# Patient Record
Sex: FEMALE
[De-identification: names, ages, dates, MRNs, and addresses within clinical notes are randomized per-mention and may not be internally consistent; named-entity substitution may affect disease eponyms.]

---

## 2022-07-01 PROBLEM — Z00.00 ENCOUNTER FOR PREVENTIVE HEALTH EXAMINATION: Status: ACTIVE | Noted: 2022-07-01

## 2022-08-02 ENCOUNTER — APPOINTMENT (OUTPATIENT)
Dept: GASTROENTEROLOGY | Facility: CLINIC | Age: 22
End: 2022-08-02

## 2022-08-02 VITALS
WEIGHT: 150 LBS | TEMPERATURE: 97.3 F | HEIGHT: 65 IN | RESPIRATION RATE: 18 BRPM | OXYGEN SATURATION: 97 % | SYSTOLIC BLOOD PRESSURE: 111 MMHG | HEART RATE: 84 BPM | DIASTOLIC BLOOD PRESSURE: 63 MMHG | BODY MASS INDEX: 24.99 KG/M2

## 2022-08-02 DIAGNOSIS — K59.00 CONSTIPATION, UNSPECIFIED: ICD-10-CM

## 2022-08-02 DIAGNOSIS — Z78.9 OTHER SPECIFIED HEALTH STATUS: ICD-10-CM

## 2022-08-02 DIAGNOSIS — K21.9 GASTRO-ESOPHAGEAL REFLUX DISEASE W/OUT ESOPHAGITIS: ICD-10-CM

## 2022-08-02 PROCEDURE — 99204 OFFICE O/P NEW MOD 45 MIN: CPT

## 2022-08-02 PROCEDURE — 99072 ADDL SUPL MATRL&STAF TM PHE: CPT

## 2022-08-02 RX ORDER — OMEPRAZOLE 40 MG/1
40 CAPSULE, DELAYED RELEASE ORAL
Qty: 1 | Refills: 1 | Status: ACTIVE | COMMUNITY
Start: 2022-08-02 | End: 1900-01-01

## 2022-08-02 RX ORDER — POLYETHYLENE GLYCOL 3350 17 G/17G
17 POWDER, FOR SOLUTION ORAL DAILY
Qty: 30 | Refills: 3 | Status: ACTIVE | COMMUNITY
Start: 2022-08-02 | End: 1900-01-01

## 2022-08-02 NOTE — HISTORY OF PRESENT ILLNESS
[FreeTextEntry1] : 21 yo female with c/o GERD and constipation and gas for one month. GERD 3 to 4 x a week. no dysphagia, no weight loss, no n/v. patient took pepcid with partial relief. Patient reports multiple small bms a day, no brbpr, no melena. no weight loss. Reports gas.\par \par no family h/o colon or gastric cancer

## 2022-08-02 NOTE — PHYSICAL EXAM
[General Appearance - Alert] : alert [General Appearance - In No Acute Distress] : in no acute distress [General Appearance - Well Nourished] : well nourished [General Appearance - Well Developed] : well developed [Sclera] : the sclera and conjunctiva were normal [] : the neck was supple [Auscultation Breath Sounds / Voice Sounds] : lungs were clear to auscultation bilaterally [Heart Sounds] : normal S1 and S2 [Bowel Sounds] : normal bowel sounds [Abdomen Soft] : soft [Abdomen Tenderness] : non-tender [No CVA Tenderness] : no ~M costovertebral angle tenderness [Abnormal Walk] : normal gait [Nail Clubbing] : no clubbing  or cyanosis of the fingernails [No Focal Deficits] : no focal deficits [Oriented To Time, Place, And Person] : oriented to person, place, and time

## 2022-08-02 NOTE — ASSESSMENT
[FreeTextEntry1] : 1. new onset gerd, no alarm symptoms\par \par planppi daily\par       f/u gi in 3 months\par       antireflux diet\par \par 2. constipation\par \par plan probiotics\par       miralax daily\par \par 3.RHCM\par \par plan patient needs CPE with blood work,pt to find PCP  through insurance\par \par

## 2022-08-02 NOTE — REVIEW OF SYSTEMS
[As Noted in HPI] : as noted in HPI [Anxiety] : anxiety [Depression] : depression [Muscle Weakness] : muscle weakness [Fever] : no fever [Chills] : no chills [Feeling Poorly] : not feeling poorly [Feeling Tired] : not feeling tired [Eyesight Problems] : no eyesight problems [Nosebleeds] : no nosebleeds [Nasal Discharge] : no nasal discharge [Chest Pain] : no chest pain [Cough] : no cough [SOB on Exertion] : no shortness of breath during exertion [Pelvic Pain] : no pelvic pain [Dysmenorrhea] : no dysmenorrhea [Limb Pain] : no limb pain [Skin Lesions] : no skin lesions [Swollen Glands] : no swollen glands [Swollen Glands In The Neck] : no swollen glands in the neck

## 2024-10-26 ENCOUNTER — OUTPATIENT (OUTPATIENT)
Dept: OUTPATIENT SERVICES | Facility: HOSPITAL | Age: 24
LOS: 1 days | End: 2024-10-26
Payer: MEDICAID

## 2024-10-26 VITALS
DIASTOLIC BLOOD PRESSURE: 72 MMHG | RESPIRATION RATE: 18 BRPM | SYSTOLIC BLOOD PRESSURE: 113 MMHG | HEART RATE: 80 BPM | TEMPERATURE: 98 F | OXYGEN SATURATION: 100 %

## 2024-10-26 DIAGNOSIS — O26.899 OTHER SPECIFIED PREGNANCY RELATED CONDITIONS, UNSPECIFIED TRIMESTER: ICD-10-CM

## 2024-10-26 LAB
AMNISURE ROM (RUPTURE OF MEMBRANES): NEGATIVE — SIGNIFICANT CHANGE UP
APPEARANCE UR: CLEAR — SIGNIFICANT CHANGE UP
BASOPHILS # BLD AUTO: 0 K/UL — SIGNIFICANT CHANGE UP (ref 0–0.2)
BASOPHILS NFR BLD AUTO: 0 % — SIGNIFICANT CHANGE UP (ref 0–2)
BILIRUB UR-MCNC: NEGATIVE — SIGNIFICANT CHANGE UP
COLOR SPEC: YELLOW — SIGNIFICANT CHANGE UP
DIFF PNL FLD: NEGATIVE — SIGNIFICANT CHANGE UP
EOSINOPHIL # BLD AUTO: 1.5 K/UL — HIGH (ref 0–0.5)
EOSINOPHIL NFR BLD AUTO: 9 % — HIGH (ref 0–6)
GLUCOSE UR QL: NEGATIVE MG/DL — SIGNIFICANT CHANGE UP
HCT VFR BLD CALC: 35.4 % — SIGNIFICANT CHANGE UP (ref 34.5–45)
HGB BLD-MCNC: 11.7 G/DL — SIGNIFICANT CHANGE UP (ref 11.5–15.5)
KETONES UR-MCNC: NEGATIVE MG/DL — SIGNIFICANT CHANGE UP
LEUKOCYTE ESTERASE UR-ACNC: NEGATIVE — SIGNIFICANT CHANGE UP
LYMPHOCYTES # BLD AUTO: 19 % — SIGNIFICANT CHANGE UP (ref 13–44)
LYMPHOCYTES # BLD AUTO: 3.17 K/UL — SIGNIFICANT CHANGE UP (ref 1–3.3)
MCHC RBC-ENTMCNC: 29 PG — SIGNIFICANT CHANGE UP (ref 27–34)
MCHC RBC-ENTMCNC: 33.1 GM/DL — SIGNIFICANT CHANGE UP (ref 32–36)
MCV RBC AUTO: 87.6 FL — SIGNIFICANT CHANGE UP (ref 80–100)
MONOCYTES # BLD AUTO: 0.83 K/UL — SIGNIFICANT CHANGE UP (ref 0–0.9)
MONOCYTES NFR BLD AUTO: 5 % — SIGNIFICANT CHANGE UP (ref 2–14)
NEUTROPHILS # BLD AUTO: 9.68 K/UL — HIGH (ref 1.8–7.4)
NEUTROPHILS NFR BLD AUTO: 57 % — SIGNIFICANT CHANGE UP (ref 43–77)
NITRITE UR-MCNC: NEGATIVE — SIGNIFICANT CHANGE UP
PH UR: 6.5 — SIGNIFICANT CHANGE UP (ref 5–8)
PLATELET # BLD AUTO: 341 K/UL — SIGNIFICANT CHANGE UP (ref 150–400)
PROT UR-MCNC: NEGATIVE MG/DL — SIGNIFICANT CHANGE UP
RBC # BLD: 4.04 M/UL — SIGNIFICANT CHANGE UP (ref 3.8–5.2)
RBC # FLD: 13.3 % — SIGNIFICANT CHANGE UP (ref 10.3–14.5)
SP GR SPEC: 1 — LOW (ref 1–1.03)
UROBILINOGEN FLD QL: 0.2 MG/DL — SIGNIFICANT CHANGE UP (ref 0.2–1)
WBC # BLD: 16.69 K/UL — HIGH (ref 3.8–10.5)
WBC # FLD AUTO: 16.69 K/UL — HIGH (ref 3.8–10.5)

## 2024-10-26 PROCEDURE — 76818 FETAL BIOPHYS PROFILE W/NST: CPT

## 2024-10-26 PROCEDURE — 85025 COMPLETE CBC W/AUTO DIFF WBC: CPT

## 2024-10-26 PROCEDURE — 76817 TRANSVAGINAL US OBSTETRIC: CPT | Mod: 26

## 2024-10-26 PROCEDURE — 36415 COLL VENOUS BLD VENIPUNCTURE: CPT

## 2024-10-26 PROCEDURE — 99283 EMERGENCY DEPT VISIT LOW MDM: CPT

## 2024-10-26 PROCEDURE — 99213 OFFICE O/P EST LOW 20 MIN: CPT

## 2024-10-26 PROCEDURE — 84112 EVAL AMNIOTIC FLUID PROTEIN: CPT

## 2024-10-26 PROCEDURE — 76818 FETAL BIOPHYS PROFILE W/NST: CPT | Mod: 26

## 2024-10-26 PROCEDURE — G0463: CPT

## 2024-10-26 PROCEDURE — 59025 FETAL NON-STRESS TEST: CPT

## 2024-10-26 PROCEDURE — 81003 URINALYSIS AUTO W/O SCOPE: CPT

## 2024-10-26 PROCEDURE — 76817 TRANSVAGINAL US OBSTETRIC: CPT

## 2024-10-26 RX ORDER — SODIUM CHLORIDE 9 G/1000ML
1000 INJECTION, SOLUTION INTRAVENOUS ONCE
Refills: 0 | Status: DISCONTINUED | OUTPATIENT
Start: 2024-10-26 | End: 2024-11-10

## 2024-10-29 DIAGNOSIS — Z03.71 ENCOUNTER FOR SUSPECTED PROBLEM WITH AMNIOTIC CAVITY AND MEMBRANE RULED OUT: ICD-10-CM

## 2024-10-29 DIAGNOSIS — Z3A.29 29 WEEKS GESTATION OF PREGNANCY: ICD-10-CM

## 2024-12-07 ENCOUNTER — OUTPATIENT (OUTPATIENT)
Dept: OUTPATIENT SERVICES | Facility: HOSPITAL | Age: 24
LOS: 1 days | End: 2024-12-07
Payer: MEDICAID

## 2024-12-07 VITALS
SYSTOLIC BLOOD PRESSURE: 119 MMHG | TEMPERATURE: 98 F | DIASTOLIC BLOOD PRESSURE: 76 MMHG | HEART RATE: 93 BPM | RESPIRATION RATE: 16 BRPM | OXYGEN SATURATION: 99 %

## 2024-12-07 DIAGNOSIS — O26.899 OTHER SPECIFIED PREGNANCY RELATED CONDITIONS, UNSPECIFIED TRIMESTER: ICD-10-CM

## 2024-12-07 PROCEDURE — 59025 FETAL NON-STRESS TEST: CPT

## 2024-12-07 PROCEDURE — 59025 FETAL NON-STRESS TEST: CPT | Mod: 26

## 2024-12-07 PROCEDURE — 99212 OFFICE O/P EST SF 10 MIN: CPT | Mod: 25

## 2024-12-07 PROCEDURE — 99283 EMERGENCY DEPT VISIT LOW MDM: CPT

## 2024-12-07 PROCEDURE — G0463: CPT

## 2024-12-09 DIAGNOSIS — Z3A.35 35 WEEKS GESTATION OF PREGNANCY: ICD-10-CM

## 2024-12-09 DIAGNOSIS — O36.8130 DECREASED FETAL MOVEMENTS, THIRD TRIMESTER, NOT APPLICABLE OR UNSPECIFIED: ICD-10-CM

## 2024-12-09 DIAGNOSIS — O47.03 FALSE LABOR BEFORE 37 COMPLETED WEEKS OF GESTATION, THIRD TRIMESTER: ICD-10-CM

## 2024-12-16 ENCOUNTER — OUTPATIENT (OUTPATIENT)
Dept: OUTPATIENT SERVICES | Facility: HOSPITAL | Age: 24
LOS: 1 days | End: 2024-12-16
Payer: MEDICAID

## 2024-12-16 VITALS
OXYGEN SATURATION: 99 % | DIASTOLIC BLOOD PRESSURE: 78 MMHG | SYSTOLIC BLOOD PRESSURE: 128 MMHG | TEMPERATURE: 100 F | HEART RATE: 108 BPM | RESPIRATION RATE: 18 BRPM

## 2024-12-16 DIAGNOSIS — O26.899 OTHER SPECIFIED PREGNANCY RELATED CONDITIONS, UNSPECIFIED TRIMESTER: ICD-10-CM

## 2024-12-16 PROCEDURE — G0463: CPT

## 2024-12-16 PROCEDURE — 99213 OFFICE O/P EST LOW 20 MIN: CPT

## 2024-12-16 PROCEDURE — 96360 HYDRATION IV INFUSION INIT: CPT

## 2024-12-16 PROCEDURE — 59025 FETAL NON-STRESS TEST: CPT

## 2024-12-16 PROCEDURE — 99283 EMERGENCY DEPT VISIT LOW MDM: CPT

## 2024-12-16 RX ORDER — 0.9 % SODIUM CHLORIDE 0.9 %
1000 INTRAVENOUS SOLUTION INTRAVENOUS ONCE
Refills: 0 | Status: COMPLETED | OUTPATIENT
Start: 2024-12-16 | End: 2024-12-16

## 2024-12-16 RX ADMIN — Medication 1000 MILLILITER(S): at 01:45

## 2024-12-16 NOTE — OB PROVIDER TRIAGE NOTE - PLAN OF CARE
Continue prenatal vitamins   If water breaks, you develop contractions, or notice vaginal bleeding return to delivery room  Check the baby movements with daily fetal kick count  No sex, nothing in the vagina, no heavy lifting and no strenous activities  Modified activities: walk as tolerated to prevent clot formation  Increase hydration, drink 2-3liters of water per day; avoid caffeine beverages which can cause palpitations and dehydration Continue prenatal vitamins   If water breaks, you develop contractions, or notice vaginal bleeding return to delivery room  Check the baby movements with daily fetal kick count  No sex, nothing in the vagina, no heavy lifting and no strenuous activities  Modified activities: walk as tolerated to prevent clot formation  Increase hydration, drink 2-3liters of water per day; avoid caffeine beverages which can cause palpitations and dehydration

## 2024-12-16 NOTE — OB PROVIDER TRIAGE NOTE - HISTORY OF PRESENT ILLNESS
23 yo  @ 36 3/7 weeks presents with contraction pain since 10pm with contractions every 2 minutes. Patient reports she had a couple episodes of vomiting earlier in the day. Pt reports passing her mucous plug when she was last seen in triage at [facility] on 2024. Pt reports at her last OB appt with Dr. Shah on  she was closed. Pt denies decreased fetal movement, vaginal bleeding or LOF. Pt denies fever, chills, headache, cp, sob, dizziness, palpitations, lightheadedness, urinary complaints or diarrhea.     PNC w/ Dr. Shah uncomplicated    POBHx:  - nulliparous    PGYNHx:  - no hx of fibroids, cysts STDs, abnormal PAPs, abnormal menses    PMHx:  - asthma; never had an asthma attack in the past, never hospitalized; no on meds currently    Meds:  - PNV  - Vitamin B3  - Famotidine     PSHx:  - denies    Allergies:  - NKDA    Social:   - no hx of smoking, alcohol use or drug use     PsychHX:  - no hx of anxiety or depression 23 yo  @ 36 3/7 weeks presents with contraction pain since 10pm with contractions every 2 minutes. Patient reports she had a couple episodes of vomiting earlier in the day. Pt reports passing her mucous plug when she was last seen in triage at [facility] on 2024. Pt reports at her last OB appt with Dr. Shah on  she was closed. Pt denies decreased fetal movement, vaginal bleeding or LOF. Pt denies fever, chills, headache, cp, sob, dizziness, palpitations, lightheadedness, urinary complaints or diarrhea.     PNC w/ Dr. Shah uncomplicated    POBHx:  - nulliparous    PGYNHx:  - no hx of fibroids, cysts STDs, abnormal PAPs, abnormal menses    PMHx:  - asthma; never had an asthma attack in the past, never hospitalized; no on meds currently    Meds:  - PNV  - Vitamin B3  - Famotidine     PSHx:  - denies    Allergies:  - NKDA    Social:   - no hx of smoking, alcohol use or drug use     PsychHx:  - no hx of anxiety or depression

## 2024-12-16 NOTE — OB RN TRIAGE NOTE - NSNURSINGINSTR_OBGYN_ALL_OB_FT
pt is a&ox4 , d/c home instructions given by SUSANA Rutledge reinforced by this RN. pt d/c home with pre-term labor precaution and kick count sheets, instructed to return if leakage of fluid, vaginal bleeding, decreased or no fetal movement, increase water intake to 2litres per day, f/u with next appointment with Dr Shah. pt verbalized understanding with good teach back left unit in stable condition with her mother. IV fluid heplock removed after IV completion with no swelling nor reddness noted to IV site.

## 2024-12-16 NOTE — OB PROVIDER TRIAGE NOTE - ADDITIONAL INSTRUCTIONS
Continue prenatal vitamins   If water breaks, you develop contractions, or notice vaginal bleeding return to delivery room  Check the baby movements with daily fetal kick count  No sex, nothing in the vagina, no heavy lifting and no strenuous activities  Modified activities: walk as tolerated to prevent clot formation  Increase hydration, drink 2-3liters of water per day; avoid caffeine beverages which can cause palpitations and dehydration F/u with Dr. Shah at next scheduled appt on 12/19  Continue prenatal vitamins   If water breaks, you develop contractions, or notice vaginal bleeding return to delivery room  Check the baby movements with daily fetal kick count  No sex, nothing in the vagina, no heavy lifting and no strenuous activities  Modified activities: walk as tolerated to prevent clot formation  Increase hydration, drink 2-3liters of water per day; avoid caffeine beverages which can cause palpitations and dehydration

## 2024-12-16 NOTE — OB PROVIDER TRIAGE NOTE - NSHPPHYSICALEXAM_GEN_ALL_CORE
Vital Signs Last 24 Hrs  T(C): 36.7 (16 Dec 2024 00:35), Max: 36.7 (16 Dec 2024 00:35)  T(F): 98.1 (16 Dec 2024 00:35), Max: 98.1 (16 Dec 2024 00:35)  HR: 103 (16 Dec 2024 00:35) (103 - 103)  BP: 119/84 (16 Dec 2024 00:35) (119/84 - 119/84)  RR: 19 (16 Dec 2024 00:35) (19 - 19)  SpO2: 99% (16 Dec 2024 00:35) (99% - 99%)    General: patient is sitting comfortably in NAD, A/O x 3  Cardio: RRR  Pulm: saturating well on room air  Abd: gravid uterus, nontender, nondistended  Extremities: nontender, no worsening edema  VE: 1/50/-3/ without evidence of bleeding or fluid  NST: 165bpm at baseline with moderate variability without accels or decels   [place]: irregular ctx

## 2024-12-16 NOTE — OB PROVIDER TRIAGE NOTE - NSOBPROVIDERNOTE_OBGYN_ALL_OB_FT
25 yo  @ 36 3/7 weeks with EDC 01/10/2025 c/w LMP 03/15/2024 presents with contraction pain since 10pm; rule out  labor; pt is stable    Plan:  1. 1L bolus LR  2. 25 yo  @ 36 3/7 weeks with EDC 01/10/2025 c/w LMP 03/15/2024 presents with contraction pain since 10pm; rule out  labor; pt is stable    Plan:  1. 1L bolus LR  2. Continue NST for one hour and reevaluate CTX pattern and cervical change as per Dr. Shah 25 yo  @ 36 3/7 weeks with EDC 01/10/2025 c/w LMP 03/15/2024 presents with contraction pain since 10pm; rule out  labor; pt is stable    Plan:  1. 1L bolus LR  2. Continue NST for one hour and reevaluate CTX pattern and cervical change as per Dr. Shah    F/u Note  - s/p 1L bolus LR patients contractions subsided and baseline went down to 155bpm at baseline with moderate variability with accels no decels 23 yo  @ 36 3/7 weeks with EDC 01/10/2025 c/w LMP 03/15/2024 presents with contraction pain since 10pm; rule out  labor; pt is stable    Plan:  1. 1L bolus LR  2. Continue NST for one hour and reevaluate CTX pattern and cervical change as per Dr. Shah    F/u Note  - s/p 1L bolus LR patients contractions subsided and baseline went down to 155bpm at baseline with moderate variability with accels no decels; uterine variability noted on toco  - one hour later and no cervical change made; pt remains /-3/int

## 2024-12-18 DIAGNOSIS — Z3A.36 36 WEEKS GESTATION OF PREGNANCY: ICD-10-CM

## 2024-12-18 DIAGNOSIS — O21.2 LATE VOMITING OF PREGNANCY: ICD-10-CM

## 2024-12-18 DIAGNOSIS — O47.03 FALSE LABOR BEFORE 37 COMPLETED WEEKS OF GESTATION, THIRD TRIMESTER: ICD-10-CM

## 2024-12-23 ENCOUNTER — OUTPATIENT (OUTPATIENT)
Dept: OUTPATIENT SERVICES | Facility: HOSPITAL | Age: 24
LOS: 1 days | End: 2024-12-23
Payer: MEDICAID

## 2024-12-23 VITALS
TEMPERATURE: 98 F | DIASTOLIC BLOOD PRESSURE: 72 MMHG | SYSTOLIC BLOOD PRESSURE: 112 MMHG | RESPIRATION RATE: 14 BRPM | HEART RATE: 78 BPM

## 2024-12-23 DIAGNOSIS — O26.899 OTHER SPECIFIED PREGNANCY RELATED CONDITIONS, UNSPECIFIED TRIMESTER: ICD-10-CM

## 2024-12-23 PROCEDURE — 59025 FETAL NON-STRESS TEST: CPT | Mod: 26

## 2024-12-23 PROCEDURE — 76819 FETAL BIOPHYS PROFIL W/O NST: CPT | Mod: 26

## 2024-12-23 PROCEDURE — 99212 OFFICE O/P EST SF 10 MIN: CPT | Mod: 25

## 2024-12-23 PROCEDURE — G0463: CPT

## 2024-12-23 PROCEDURE — 59025 FETAL NON-STRESS TEST: CPT

## 2024-12-23 PROCEDURE — 76819 FETAL BIOPHYS PROFIL W/O NST: CPT

## 2024-12-23 NOTE — OB PROVIDER TRIAGE NOTE - NSHPPHYSICALEXAM_GEN_ALL_CORE
Vital Signs Last 24 Hrs  T(C): 36.9 (23 Dec 2024 16:40), Max: 36.9 (23 Dec 2024 16:40)  T(F): 98.4 (23 Dec 2024 16:40), Max: 98.4 (23 Dec 2024 16:40)  HR: 78 (23 Dec 2024 16:40) (78 - 78)  BP: 112/72 (23 Dec 2024 16:40) (112/72 - 112/72)  BP(mean): --  RR: 14 (23 Dec 2024 16:40) (14 - 14)  SpO2: --    Parameters below as of 23 Dec 2024 16:40  Patient On (Oxygen Delivery Method): room air    gen: well appearing, no acute distress, AA+Ox 3  abd: gravid, soft, non tender  sve 1/40/-3/posterior/int    fh baseline 135 moderate variability +accels  toco q irritablility

## 2024-12-23 NOTE — OB PROVIDER TRIAGE NOTE - HISTORY OF PRESENT ILLNESS
25yo  @ 37w3d by stated  Rayshawn 1/10/2025 here for nst/bpp  Patient with no acute ob complaints  Patient admits to fetal movement  denies vaginal bleeding, leakage of fluid or contractions pain  pnc with Dr Shah , last visit 2024, uncomplicated per patient  pobhx nulliparous  pgynhx: lmp 3/15/2024 denies abn pap, fibroids, cyst or stds  pmedhx:  denies   psurghx: denies   allergies:  nkda  social: denies smoking, alcohol use or recreational drug use  25yo  @ 37w3d by stated  Hakeem 1/10/2025 here for nst/bpp  Patient with no acute ob complaints  Patient admits to fetal movement  denies vaginal bleeding, leakage of fluid or contractions pain  pnc with Dr Shah , last visit 2024, uncomplicated per patient  HAKEEM by sono on 2024, crl 13wks inconsi  pobhx nulliparous  pgynhx: lmp 3/15/2024 denies abn pap, fibroids, cyst or stds  pmedhx:  denies   psurghx: denies   allergies:  nkda  social: denies smoking, alcohol use or recreational drug use

## 2024-12-23 NOTE — OB PROVIDER TRIAGE NOTE - NSHPLABSRESULTS_GEN_ALL_CORE
< from: US Fetal Bio Profile w/o Non-Stress (12.23.24 @ 16:26) >    INTERPRETATION:  CLINICAL INFORMATION: Third trimester pregnancy, assess   fetal well-being.    GESTATIONAL AGE: 37 weeks 3 days.    COMPARISON: Obstetric sonogram dated 10/26/2024.    TECHNIQUE:  Transabdominal pelvic sonogram only.    FINDINGS:    Maternal Ovaries/Adnexa: Not visualized.    Single live Intrauterine gestation is present.  Fetal position: CEPHALIC presentation.  Placenta location: POSTERIOR.  Amniotic fluid volume: LACIE 15.1 cm.  Cervical length: 3.5 cm by transabdominal technique.  Fetal motion is seen in real-time and the fetal heart rate measures 129   bpm.    Fetal anatomy and umbilical cord were not evaluated.    The biophysical profile is 8/8.    Body Movement: 2  (score 2) Greater or equal than 3 body/limb movements  (score 0) Less than 3 body/limb movements    Tone: 2  (score 2) One or more episodes extension/flexion  (score 0) No episodes extension/flexion    Breathing movements: 2  (score 2) Any breathing movements or hiccups  (score 0) No breathing movements    Amniotic fluid: 2  (score 2) One pocket >= 2cm in 2 perpendicular planes  (score 0) <2 X 2 cm pocket    Additional comments: None.    IMPRESSION:  Single live intrauterine fetus in cephalic lie.    Normal biophysical profile.    < end of copied text >

## 2024-12-23 NOTE — OB RN TRIAGE NOTE - FALL HARM RISK - UNIVERSAL INTERVENTIONS
Bed in lowest position, wheels locked, appropriate side rails in place/Call bell, personal items and telephone in reach/Instruct patient to call for assistance before getting out of bed or chair/Non-slip footwear when patient is out of bed/[place] to call system/Physically safe environment - no spills, clutter or unnecessary equipment/Purposeful Proactive Rounding/Room/bathroom lighting operational, light cord in reach

## 2024-12-23 NOTE — OB PROVIDER TRIAGE NOTE - ADDITIONAL INSTRUCTIONS
a/p siup @ 37w3d by ultrasound, here for nst/bpp, nst reactive bpp 8/8  - d/c home with labor precautions  - patient to return to L+D if any vaginal bleeding, leakage fluid or painful contraction  - kick counts  - adequate hydration encouraged  - patient to f/u with primary OB this week a/p siup @ 37w3d by ultrasound, here for nst/bpp, nst reactive bpp 8/8  - d/c home with labor precautions  - patient to return to L+D if any vaginal bleeding, leakage fluid or painful contraction  - kick counts  - adequate hydration encouraged  - patient to f/u with DR Azul in office on 12/26/2024

## 2024-12-26 DIAGNOSIS — Z3A.37 37 WEEKS GESTATION OF PREGNANCY: ICD-10-CM

## 2024-12-26 DIAGNOSIS — Z03.79 ENCOUNTER FOR OTHER SUSPECTED MATERNAL AND FETAL CONDITIONS RULED OUT: ICD-10-CM

## 2024-12-30 ENCOUNTER — INPATIENT (INPATIENT)
Facility: HOSPITAL | Age: 24
LOS: 2 days | Discharge: HOME | DRG: 951 | End: 2025-01-02
Attending: OBSTETRICS & GYNECOLOGY | Admitting: OBSTETRICS & GYNECOLOGY
Payer: MEDICAID

## 2024-12-30 VITALS — OXYGEN SATURATION: 96 % | TEMPERATURE: 99 F | RESPIRATION RATE: 16 BRPM | HEART RATE: 103 BPM

## 2024-12-30 DIAGNOSIS — Z3A.38 38 WEEKS GESTATION OF PREGNANCY: ICD-10-CM

## 2024-12-30 DIAGNOSIS — O42.90 PREMATURE RUPTURE OF MEMBRANES, UNSPECIFIED AS TO LENGTH OF TIME BETWEEN RUPTURE AND ONSET OF LABOR, UNSPECIFIED WEEKS OF GESTATION: ICD-10-CM

## 2024-12-30 DIAGNOSIS — Z34.80 ENCOUNTER FOR SUPERVISION OF OTHER NORMAL PREGNANCY, UNSPECIFIED TRIMESTER: ICD-10-CM

## 2024-12-30 DIAGNOSIS — O26.899 OTHER SPECIFIED PREGNANCY RELATED CONDITIONS, UNSPECIFIED TRIMESTER: ICD-10-CM

## 2024-12-30 LAB
AMNISURE ROM (RUPTURE OF MEMBRANES): POSITIVE
APTT BLD: 30.6 SEC — SIGNIFICANT CHANGE UP (ref 24.5–35.6)
BASOPHILS # BLD AUTO: 0 K/UL — SIGNIFICANT CHANGE UP (ref 0–0.2)
BASOPHILS NFR BLD AUTO: 0 % — SIGNIFICANT CHANGE UP (ref 0–2)
EOSINOPHIL # BLD AUTO: 0.56 K/UL — HIGH (ref 0–0.5)
EOSINOPHIL NFR BLD AUTO: 4 % — SIGNIFICANT CHANGE UP (ref 0–6)
FIBRINOGEN PPP-MCNC: 613 MG/DL — HIGH (ref 200–475)
HCT VFR BLD CALC: 35.7 % — SIGNIFICANT CHANGE UP (ref 34.5–45)
HGB BLD-MCNC: 12.1 G/DL — SIGNIFICANT CHANGE UP (ref 11.5–15.5)
INR BLD: 0.91 RATIO — SIGNIFICANT CHANGE UP (ref 0.85–1.16)
LYMPHOCYTES # BLD AUTO: 19 % — SIGNIFICANT CHANGE UP (ref 13–44)
LYMPHOCYTES # BLD AUTO: 2.64 K/UL — SIGNIFICANT CHANGE UP (ref 1–3.3)
MCHC RBC-ENTMCNC: 28.3 PG — SIGNIFICANT CHANGE UP (ref 27–34)
MCHC RBC-ENTMCNC: 33.9 G/DL — SIGNIFICANT CHANGE UP (ref 32–36)
MCV RBC AUTO: 83.4 FL — SIGNIFICANT CHANGE UP (ref 80–100)
MONOCYTES # BLD AUTO: 0.56 K/UL — SIGNIFICANT CHANGE UP (ref 0–0.9)
MONOCYTES NFR BLD AUTO: 4 % — SIGNIFICANT CHANGE UP (ref 2–14)
NEUTROPHILS # BLD AUTO: 7.38 K/UL — SIGNIFICANT CHANGE UP (ref 1.8–7.4)
NEUTROPHILS NFR BLD AUTO: 53 % — SIGNIFICANT CHANGE UP (ref 43–77)
PLATELET # BLD AUTO: 375 K/UL — SIGNIFICANT CHANGE UP (ref 150–400)
PROTHROM AB SERPL-ACNC: 10.6 SEC — SIGNIFICANT CHANGE UP (ref 9.9–13.4)
RBC # BLD: 4.28 M/UL — SIGNIFICANT CHANGE UP (ref 3.8–5.2)
RBC # FLD: 14.7 % — HIGH (ref 10.3–14.5)
WBC # BLD: 13.92 K/UL — HIGH (ref 3.8–10.5)
WBC # FLD AUTO: 13.92 K/UL — HIGH (ref 3.8–10.5)

## 2024-12-30 RX ORDER — OXYTOCIN IN 5 % DEXTROSE 20/500ML
167 PLASTIC BAG, INJECTION (ML) INTRAVENOUS
Qty: 30 | Refills: 0 | Status: DISCONTINUED | OUTPATIENT
Start: 2024-12-30 | End: 2024-12-31

## 2024-12-30 RX ORDER — CITRIC ACID/SODIUM CITRATE 334-500MG
15 SOLUTION, ORAL ORAL EVERY 6 HOURS
Refills: 0 | Status: DISCONTINUED | OUTPATIENT
Start: 2024-12-30 | End: 2024-12-31

## 2024-12-30 RX ORDER — CITRIC ACID/SODIUM CITRATE 334-500MG
15 SOLUTION, ORAL ORAL EVERY 6 HOURS
Refills: 0 | Status: DISCONTINUED | OUTPATIENT
Start: 2024-12-30 | End: 2024-12-30

## 2024-12-30 RX ORDER — AMPICILLIN TRIHYDRATE 125 MG/5ML
1 SUSPENSION, RECONSTITUTED, ORAL (ML) ORAL EVERY 6 HOURS
Refills: 0 | Status: DISCONTINUED | OUTPATIENT
Start: 2024-12-30 | End: 2024-12-31

## 2024-12-30 RX ORDER — CHLORHEXIDINE GLUCONATE 1.2 MG/ML
1 RINSE ORAL DAILY
Refills: 0 | Status: DISCONTINUED | OUTPATIENT
Start: 2024-12-30 | End: 2024-12-31

## 2024-12-30 RX ORDER — AMPICILLIN TRIHYDRATE 125 MG/5ML
2 SUSPENSION, RECONSTITUTED, ORAL (ML) ORAL ONCE
Refills: 0 | Status: COMPLETED | OUTPATIENT
Start: 2024-12-30 | End: 2024-12-30

## 2024-12-30 RX ORDER — SODIUM CHLORIDE 9 MG/ML
1000 INJECTION, SOLUTION INTRAVENOUS
Refills: 0 | Status: DISCONTINUED | OUTPATIENT
Start: 2024-12-30 | End: 2024-12-31

## 2024-12-30 RX ADMIN — SODIUM CHLORIDE 125 MILLILITER(S): 9 INJECTION, SOLUTION INTRAVENOUS at 17:00

## 2024-12-30 RX ADMIN — Medication 200 GRAM(S): at 21:25

## 2024-12-30 NOTE — OB RN PATIENT PROFILE - NAME OF FATHER, OB PROFILE
Thyroid Supplements Protocol [id number]/23/2021 06:53 AM   Appointment in past 12 or next 3 months Protocol Details    TSH test in past 12 months     TSH value between 0.350 and 5.500 IU/ml Dexter Mc

## 2024-12-30 NOTE — OB PROVIDER H&P - NSHPLABSRESULTS_GEN_ALL_CORE
12.1   13.92 )-----------( 375      ( 30 Dec 2024 17:55 )             35.7   AmniSure ROM (Rupture of Membranes): Positive: TYPE:(C=Critical, N=Notification, A=Abnormal) C  TESTS: AMNISR  DATE/TIME CALLED: 12/30/2024 16:22:30 EST  CALLED TO: PATRICE OJ  READ BACK (2 Patient Identifiers)(Y/N): Y  READ BACK VALUES (Y/N): Y  CALLED BY: BRIAN 12/30/2024 16:22:36 EST  AmniSure is an immunochromatographic assay for PAMG-1, an amniotic fluid  protein. Detection of PAMG-1 in vaginal fluid is evidence of ruptured  fetal membranes. Results should be interpreted in conjunction with  clinical and other laboratory findings. (12.30.24 @ 15:55)

## 2024-12-30 NOTE — OB PROVIDER H&P - NSHPPHYSICALEXAM_GEN_ALL_CORE
Vital Signs Last 24 Hrs  T(C): 36.8 (30 Dec 2024 17:45), Max: 37 (30 Dec 2024 15:29)  T(F): 98.2 (30 Dec 2024 17:45), Max: 98.6 (30 Dec 2024 15:29)  HR: 89 (30 Dec 2024 17:45) (89 - 103)  BP: 126/84 (30 Dec 2024 17:45) (117/78 - 126/84)  BP(mean): --  RR: 16 (30 Dec 2024 17:45) (16 - 18)  SpO2: 97% (30 Dec 2024 17:45) (96% - 100%)    Parameters below as of 30 Dec 2024 17:45  Patient On (Oxygen Delivery Method): room air    gen: nad, well appearing  abd; gravid, soft, non tender  speculum: no pooling, no vaginal bleeding, minimal white discharge, amnisure sent  sve by SUSANA Arvizu,  1/50/-3  nst cat 1  toco q irregular

## 2024-12-30 NOTE — OB PROVIDER H&P - NSICDXPASTMEDICALHX_GEN_ALL_CORE_FT
PAST MEDICAL HISTORY:  Asthma      PAST MEDICAL HISTORY:  History of irregular menstrual cycles

## 2024-12-30 NOTE — OB PROVIDER H&P - ASSESSMENT
Prescription approved per [facility] Refill Protocol.     a/p siup @ 38w3d, HAKEEM by teresa, here for r/o prom. amnisure positive, nst cat1  - admit for induction of labor  - consent at bedside  - admission labs  - pain management upon request  -Dr Shah notified, awaiting further  instructions on induction agent  nst reviewed by DR Landa, house attending

## 2024-12-30 NOTE — OB RN TRIAGE NOTE - SUICIDE SCREENING QUESTION 1
Fax received from [employer] stating enrollment in [employer] has been terminated for patient non-compliance. Called pt's mom to verify she received above info and if she meant to let this leonel lapse. Mom states she was not aware and had not needed to use this leonel as of yet. Mom states she will call to see about keeping account active. Gave mom # [phone] and instructed her to call with any questions. Mom verbalized complete understanding.    No

## 2024-12-30 NOTE — OB PROVIDER H&P - HISTORY OF PRESENT ILLNESS
25yo  @ 38w3d  Edc 1/10/2025  by teresa presents to triage as instructed by Dr Shah for r/o rupture. Patient states she had noticed some leakage of fluids on 2024  Patient admits to fetal movement  denies vaginal bleeding or contractions pain  pnc with Dr Shah , last visit today, uncomplicated per patient  HAKEEM by teresa on 2024, crl 13wks inconsistent with lmp  pobhx nulliparous  pgynhx: lmp 3/15/2024, irregular menses denies abn pap, fibroids, cyst or stds  pmedhx:  denies   psurghx: denies   allergies:  nkda  social: denies smoking, alcohol use or recreational drug use  23yo  @ 38w3d  Edc 1/10/2025  by teresa presents to triage as instructed by Dr Shah for r/o rupture. Patient states she had noticed some leakage of fluids on 2024  Patient admits to fetal movement  denies vaginal bleeding or contractions pain  pnc with Dr Shah , last visit today, uncomplicated per patient  HAKEEM by teresa on 2024, crl 13wks inconsistent with lmp  pobhx nulliparous  pgynhx: lmp 3/15/2024, irregular menses denies abn pap, fibroids, cyst or stds  pmedhx:  childhood asthma- has not used albuterol  since childhood, no hospital or intubations  psurghx: denies   allergies:  nkda  social: denies smoking, alcohol use or recreational drug use

## 2024-12-31 PROBLEM — J45.909 UNSPECIFIED ASTHMA, UNCOMPLICATED: Chronic | Status: INACTIVE | Noted: 2024-12-16 | Resolved: 2024-12-30

## 2024-12-31 LAB
HCV AB S/CO SERPL IA: 0.23 S/CO — SIGNIFICANT CHANGE UP (ref 0–0.99)
HCV AB SERPL-IMP: SIGNIFICANT CHANGE UP
T PALLIDUM AB TITR SER: NEGATIVE — SIGNIFICANT CHANGE UP

## 2024-12-31 RX ORDER — PNV/FERROUS FUM/DOCUSATE/FOLIC 90-50-1MG
1 TABLET, EXTENDED RELEASE ORAL DAILY
Refills: 0 | Status: DISCONTINUED | OUTPATIENT
Start: 2024-12-31 | End: 2025-01-02

## 2024-12-31 RX ORDER — IBUPROFEN 200 MG
600 TABLET ORAL EVERY 6 HOURS
Refills: 0 | Status: DISCONTINUED | OUTPATIENT
Start: 2024-12-31 | End: 2025-01-02

## 2024-12-31 RX ORDER — KETOROLAC TROMETHAMINE 30 MG/ML
30 INJECTION INTRAMUSCULAR; INTRAVENOUS ONCE
Refills: 0 | Status: DISCONTINUED | OUTPATIENT
Start: 2024-12-31 | End: 2025-01-02

## 2024-12-31 RX ORDER — MAGNESIUM HYDROXIDE 400 MG/5ML
30 SUSPENSION, ORAL (FINAL DOSE FORM) ORAL
Refills: 0 | Status: DISCONTINUED | OUTPATIENT
Start: 2024-12-31 | End: 2025-01-02

## 2024-12-31 RX ORDER — LANOLIN
1 OINTMENT (GRAM) TOPICAL EVERY 6 HOURS
Refills: 0 | Status: DISCONTINUED | OUTPATIENT
Start: 2024-12-31 | End: 2025-01-02

## 2024-12-31 RX ORDER — BENZOCAINE/MENTH/CETYLPYRD CL 15 MG-4 MG
1 LOZENGE MUCOUS MEMBRANE EVERY 6 HOURS
Refills: 0 | Status: DISCONTINUED | OUTPATIENT
Start: 2024-12-31 | End: 2025-01-02

## 2024-12-31 RX ORDER — OXYCODONE HCL 15 MG
5 TABLET ORAL
Refills: 0 | Status: DISCONTINUED | OUTPATIENT
Start: 2024-12-31 | End: 2025-01-02

## 2024-12-31 RX ORDER — HYDROCORTISONE 1 %
1 CREAM (GRAM) TOPICAL EVERY 6 HOURS
Refills: 0 | Status: DISCONTINUED | OUTPATIENT
Start: 2024-12-31 | End: 2025-01-02

## 2024-12-31 RX ORDER — WITCH HAZEL 0.5 ML/ML
1 SOLUTION TOPICAL EVERY 4 HOURS
Refills: 0 | Status: DISCONTINUED | OUTPATIENT
Start: 2024-12-31 | End: 2025-01-02

## 2024-12-31 RX ORDER — CLOSTRIDIUM TETANI TOXOID ANTIGEN (FORMALDEHYDE INACTIVATED), CORYNEBACTERIUM DIPHTHERIAE TOXOID ANTIGEN (FORMALDEHYDE INACTIVATED), BORDETELLA PERTUSSIS TOXOID ANTIGEN (GLUTARALDEHYDE INACTIVATED), BORDETELLA PERTUSSIS FILAMENTOUS HEMAGGLUTININ ANTIGEN (FORMALDEHYDE INACTIVATED), BORDETELLA PERTUSSIS PERTACTIN ANTIGEN, AND BORDETELLA PERTUSSIS FIMBRIAE 2/3 ANTIGEN 5; 2; 2.5; 5; 3; 5 [LF]/.5ML; [LF]/.5ML; UG/.5ML; UG/.5ML; UG/.5ML; UG/.5ML
0.5 INJECTION, SUSPENSION INTRAMUSCULAR ONCE
Refills: 0 | Status: DISCONTINUED | OUTPATIENT
Start: 2024-12-31 | End: 2025-01-02

## 2024-12-31 RX ORDER — ACETAMINOPHEN 80 MG/.8ML
975 SOLUTION/ DROPS ORAL
Refills: 0 | Status: DISCONTINUED | OUTPATIENT
Start: 2024-12-31 | End: 2025-01-02

## 2024-12-31 RX ORDER — OXYTOCIN IN 5 % DEXTROSE 20/500ML
2 PLASTIC BAG, INJECTION (ML) INTRAVENOUS
Qty: 30 | Refills: 0 | Status: DISCONTINUED | OUTPATIENT
Start: 2024-12-31 | End: 2024-12-31

## 2024-12-31 RX ORDER — OXYTOCIN IN 5 % DEXTROSE 20/500ML
167 PLASTIC BAG, INJECTION (ML) INTRAVENOUS
Qty: 30 | Refills: 0 | Status: DISCONTINUED | OUTPATIENT
Start: 2024-12-31 | End: 2025-01-02

## 2024-12-31 RX ORDER — SIMETHICONE 125 MG/1
80 CAPSULE, LIQUID FILLED ORAL EVERY 4 HOURS
Refills: 0 | Status: DISCONTINUED | OUTPATIENT
Start: 2024-12-31 | End: 2025-01-02

## 2024-12-31 RX ORDER — SODIUM CHLORIDE 9 MG/ML
3 INJECTION, SOLUTION INTRAMUSCULAR; INTRAVENOUS; SUBCUTANEOUS EVERY 8 HOURS
Refills: 0 | Status: DISCONTINUED | OUTPATIENT
Start: 2024-12-31 | End: 2025-01-02

## 2024-12-31 RX ORDER — IBUPROFEN 200 MG
600 TABLET ORAL EVERY 6 HOURS
Refills: 0 | Status: COMPLETED | OUTPATIENT
Start: 2024-12-31 | End: 2025-11-29

## 2024-12-31 RX ORDER — DIPHENHYDRAMINE HCL 25 MG
25 TABLET ORAL EVERY 6 HOURS
Refills: 0 | Status: DISCONTINUED | OUTPATIENT
Start: 2024-12-31 | End: 2025-01-02

## 2024-12-31 RX ADMIN — Medication 108 GRAM(S): at 03:00

## 2024-12-31 RX ADMIN — Medication 2 MILLIUNIT(S)/MIN: at 08:15

## 2024-12-31 RX ADMIN — Medication 1 TABLET(S): at 13:32

## 2024-12-31 RX ADMIN — ACETAMINOPHEN 975 MILLIGRAM(S): 80 SOLUTION/ DROPS ORAL at 21:25

## 2024-12-31 RX ADMIN — ACETAMINOPHEN 975 MILLIGRAM(S): 80 SOLUTION/ DROPS ORAL at 22:30

## 2024-12-31 RX ADMIN — Medication 167 MILLIUNIT(S)/MIN: at 10:00

## 2024-12-31 RX ADMIN — Medication 600 MILLIGRAM(S): at 23:52

## 2024-12-31 RX ADMIN — SODIUM CHLORIDE 3 MILLILITER(S): 9 INJECTION, SOLUTION INTRAMUSCULAR; INTRAVENOUS; SUBCUTANEOUS at 22:55

## 2024-12-31 RX ADMIN — Medication 108 GRAM(S): at 09:25

## 2024-12-31 RX ADMIN — SODIUM CHLORIDE 3 MILLILITER(S): 9 INJECTION, SOLUTION INTRAMUSCULAR; INTRAVENOUS; SUBCUTANEOUS at 14:51

## 2024-12-31 NOTE — OB PROVIDER LABOR PROGRESS NOTE - NS_SUBJECTIVE/OBJECTIVE_OBGYN_ALL_OB_FT
Patient seen resting comfortably.  No complaints.
Tracing note
Patient seen resting comfortably.  Pitocin started, but then discontinued due to variable/late decels.  Patient repositioned and checked.
Pt c/o pelvic pain/contractions q 2 minutes.
pt comfortable with epidural

## 2024-12-31 NOTE — OB PROVIDER LABOR PROGRESS NOTE - ASSESSMENT
25 y/o  @ 38w3d HAKEEM: 01/10, admitted for PROM on , IOL with po cytotec.     - cont close maternal and fetal monitoring  - cont po cytotec  - Discussed with Dr. Shah
Patient in active labor    -Continue monitoring fht, toco, vitals  -Pain management in place  -Encouraged to push with urge  -Continue current care    Dr Shah and Dr Valentine aware 
Patient undergoing mIOL for PROM    -Continue monitoring fht, toco, vitals  -Pain management in place  -T&C for variables  -Pitocin for augmentation  -Continue current care    Dr Shah aware 
cat II  forebag arom clear iupc placed  hold cytotec  left side  nst reviewed with dr.Williams Noe notified  keegan
25 y/o  @ 38w3d HAKEEM: 01/10, admitted for PROM on , IOL with po cytotec.     - cont close maternal and fetal monitoring  - cont po cytotec  - Discussed with Dr. Shah

## 2024-12-31 NOTE — OB PROVIDER LABOR PROGRESS NOTE - NS_OBIHIFHRDETAILS_OBGYN_ALL_OB_FT
Baseline 125, moderate variability +accels, occasional decels
Cat 1 tracing  Baseline 135  Moderate variability  + accels, no decels
Baseline 120, moderate variability +accels, +variable, early and late decels
cat II
cat 1 tracing  Baseline 120  Moderate variability  + Accels, no decels

## 2025-01-01 ENCOUNTER — TRANSCRIPTION ENCOUNTER (OUTPATIENT)
Age: 25
End: 2025-01-01

## 2025-01-01 LAB
HCT VFR BLD CALC: 32.7 % — LOW (ref 34.5–45)
HGB BLD-MCNC: 10.9 G/DL — LOW (ref 11.5–15.5)
MCHC RBC-ENTMCNC: 28.1 PG — SIGNIFICANT CHANGE UP (ref 27–34)
MCHC RBC-ENTMCNC: 33.3 G/DL — SIGNIFICANT CHANGE UP (ref 32–36)
MCV RBC AUTO: 84.3 FL — SIGNIFICANT CHANGE UP (ref 80–100)
NRBC # BLD: 0 /100 WBCS — SIGNIFICANT CHANGE UP (ref 0–0)
PLATELET # BLD AUTO: 271 K/UL — SIGNIFICANT CHANGE UP (ref 150–400)
RBC # BLD: 3.88 M/UL — SIGNIFICANT CHANGE UP (ref 3.8–5.2)
RBC # FLD: 14.7 % — HIGH (ref 10.3–14.5)
WBC # BLD: 16.19 K/UL — HIGH (ref 3.8–10.5)
WBC # FLD AUTO: 16.19 K/UL — HIGH (ref 3.8–10.5)

## 2025-01-01 RX ORDER — PNV/FERROUS FUM/DOCUSATE/FOLIC 90-50-1MG
1 TABLET, EXTENDED RELEASE ORAL
Qty: 30 | Refills: 5
Start: 2025-01-01 | End: 2025-06-29

## 2025-01-01 RX ORDER — ACETAMINOPHEN 80 MG/.8ML
2 SOLUTION/ DROPS ORAL
Qty: 40 | Refills: 0
Start: 2025-01-01 | End: 2025-01-05

## 2025-01-01 RX ORDER — SENNOSIDES AND DOCUSATE SODIUM 50; 8.6 MG/1; MG/1
2 TABLET ORAL
Qty: 14 | Refills: 0
Start: 2025-01-01 | End: 2025-01-07

## 2025-01-01 RX ORDER — IBUPROFEN 200 MG
1 TABLET ORAL
Qty: 20 | Refills: 0
Start: 2025-01-01 | End: 2025-01-05

## 2025-01-01 RX ADMIN — SODIUM CHLORIDE 3 MILLILITER(S): 9 INJECTION, SOLUTION INTRAMUSCULAR; INTRAVENOUS; SUBCUTANEOUS at 05:58

## 2025-01-01 RX ADMIN — Medication 1 SPRAY(S): at 07:00

## 2025-01-01 RX ADMIN — Medication 1 TABLET(S): at 12:24

## 2025-01-01 RX ADMIN — Medication 600 MILLIGRAM(S): at 00:57

## 2025-01-01 RX ADMIN — Medication 600 MILLIGRAM(S): at 06:19

## 2025-01-01 RX ADMIN — SODIUM CHLORIDE 3 MILLILITER(S): 9 INJECTION, SOLUTION INTRAMUSCULAR; INTRAVENOUS; SUBCUTANEOUS at 22:06

## 2025-01-01 RX ADMIN — ACETAMINOPHEN 975 MILLIGRAM(S): 80 SOLUTION/ DROPS ORAL at 23:02

## 2025-01-01 RX ADMIN — SODIUM CHLORIDE 3 MILLILITER(S): 9 INJECTION, SOLUTION INTRAMUSCULAR; INTRAVENOUS; SUBCUTANEOUS at 14:00

## 2025-01-01 RX ADMIN — ACETAMINOPHEN 975 MILLIGRAM(S): 80 SOLUTION/ DROPS ORAL at 03:06

## 2025-01-01 RX ADMIN — ACETAMINOPHEN 975 MILLIGRAM(S): 80 SOLUTION/ DROPS ORAL at 22:02

## 2025-01-01 RX ADMIN — ACETAMINOPHEN 975 MILLIGRAM(S): 80 SOLUTION/ DROPS ORAL at 04:00

## 2025-01-01 RX ADMIN — Medication 600 MILLIGRAM(S): at 05:47

## 2025-01-01 NOTE — DISCHARGE NOTE OB - PATIENT PORTAL LINK FT
You can access the FollowMyHealth Patient Portal offered by [facility] by registering at the following website: [email or web address]. By joining [facility]’s FollowMyHealth portal, you will also be able to view your health information using other applications (apps) compatible with our system.

## 2025-01-01 NOTE — DISCHARGE NOTE OB - FINANCIAL ASSISTANCE
[facility] provides services at a reduced cost to those who are determined to be eligible through [facility]’s financial assistance program. Information regarding [facility]’s financial assistance program can be found by going to [email or web address] or by calling [phone].

## 2025-01-01 NOTE — DISCHARGE NOTE OB - HOSPITAL COURSE
25 y/o admitted for miol for prolonged PROM. S/p  at 38 weeks 4 days    Uncomplicated delivery and postpartum course.    Patient meeting all milestones and stable for discharge.

## 2025-01-01 NOTE — DISCHARGE NOTE OB - CARE PROVIDER_API CALL
Regan Shah  Obstetrics and Gynecology  [address] [place] [id number]  Phone: [phone]  Fax: [phone]  Follow Up Time: 1 month

## 2025-01-02 VITALS
TEMPERATURE: 97 F | SYSTOLIC BLOOD PRESSURE: 111 MMHG | HEART RATE: 66 BPM | OXYGEN SATURATION: 97 % | DIASTOLIC BLOOD PRESSURE: 73 MMHG | RESPIRATION RATE: 16 BRPM

## 2025-01-02 PROCEDURE — 86923 COMPATIBILITY TEST ELECTRIC: CPT

## 2025-01-02 PROCEDURE — 86803 HEPATITIS C AB TEST: CPT

## 2025-01-02 PROCEDURE — 85027 COMPLETE CBC AUTOMATED: CPT

## 2025-01-02 PROCEDURE — 85610 PROTHROMBIN TIME: CPT

## 2025-01-02 PROCEDURE — 86900 BLOOD TYPING SEROLOGIC ABO: CPT

## 2025-01-02 PROCEDURE — 59050 FETAL MONITOR W/REPORT: CPT

## 2025-01-02 PROCEDURE — 86850 RBC ANTIBODY SCREEN: CPT

## 2025-01-02 PROCEDURE — 85384 FIBRINOGEN ACTIVITY: CPT

## 2025-01-02 PROCEDURE — 86901 BLOOD TYPING SEROLOGIC RH(D): CPT

## 2025-01-02 PROCEDURE — 85730 THROMBOPLASTIN TIME PARTIAL: CPT

## 2025-01-02 PROCEDURE — 36415 COLL VENOUS BLD VENIPUNCTURE: CPT

## 2025-01-02 PROCEDURE — 84112 EVAL AMNIOTIC FLUID PROTEIN: CPT

## 2025-01-02 PROCEDURE — 86780 TREPONEMA PALLIDUM: CPT

## 2025-01-02 PROCEDURE — 85025 COMPLETE CBC W/AUTO DIFF WBC: CPT

## 2025-01-02 RX ADMIN — Medication 600 MILLIGRAM(S): at 06:19

## 2025-01-02 RX ADMIN — SODIUM CHLORIDE 3 MILLILITER(S): 9 INJECTION, SOLUTION INTRAMUSCULAR; INTRAVENOUS; SUBCUTANEOUS at 05:22

## 2025-01-02 RX ADMIN — Medication 600 MILLIGRAM(S): at 05:19

## 2025-01-02 NOTE — LACTATION INITIAL EVALUATION - POTENTIAL FOR
ineffective breastfeeding/sore breast/s/sore nipples/engorgement/knowledge deficit/mastitis/plugged ducts/feeding confusion/latch on difficulty/low supply

## 2025-01-02 NOTE — PROGRESS NOTE ADULT - ASSESSMENT
25 yo PPD#1 s/p  @ 38 weeks 4 days, routine delivery and postpartum course  
A/P:  PPD#2 s/p   at 38 weeks 4 days  - Discharge home with instructions  -Follow up in office in 5-6 weeks for postpartum visit  -Breastfeeding encouraged   -d/w dr Shah

## 2025-01-02 NOTE — PROGRESS NOTE ADULT - PROBLEM SELECTOR PLAN 1
- Discharge home with instructions  -Follow up in office in 5-6 weeks for postpartum visit  -Breastfeeding encouraged   -d/w dr Shah
-Continue pain management  -DVT ppx: Encourage OOB and ambulation  -Continue current care  -Plan for discharge tomorrow  -d/w Dr. Shah

## 2025-01-02 NOTE — LACTATION INITIAL EVALUATION - LACTATION INTERVENTIONS
SGA infant. Breastfeeding on cue 10-12X/24 hours with diaper count to assess for adequate intake, safe skin to skin and rooming-in encouraged. Mom requests formula. Explored reason and discussed risks of supplementing while breastfeeding without medical reason. Mom still chooses to supplement with formula. Explained risks of using artificial nipples and discussed options for using alternative feeding methods instead of nipple.  Safe formula preparation and feeding handout provided and discussed. Attended breastfeeding, postpartum and  care class today. Patientst's questions/concerns regarding breastfeeding, general self and 's care addressed. Educated on Shaken Baby Syndrome and Safe Sleep Practices. Danger signs after birth and Hand Expression videos viewed from [employer]. Referral for breastpump sent via pt's insurance per pt's request Additional community breastfeeding resources given - [facility] Breastfeeding Warmline, [employer] x2 Locations./initiate/review safe skin-to-skin/initiate/review hand expression/initiate/review pumping guidelines and safe milk handling/initiate/review techniques for position and latch/post discharge community resources provided/review techniques to increase milk supply/review techniques to manage sore nipples/engorgement/initiate/review breast massage/compression/reviewed components of an effective feeding and at least 8 effective feedings per day required/reviewed importance of monitoring infant diapers, the breastfeeding log, and minimum output each day/reviewed risks of unnecessary formula supplementation/reviewed strategies to transition to breastfeeding only/reviewed benefits and recommendations for rooming in/reviewed feeding on demand/by cue at least 8 times a day/recommended follow-up with pediatrician within 24 hours of discharge/reviewed indications of inadequate milk transfer that would require supplementation

## 2025-01-02 NOTE — PROGRESS NOTE ADULT - SUBJECTIVE AND OBJECTIVE BOX
Patient seen at bedside resting comfortably offers no current complaints. Ambulating and voiding without difficulty.  Passing flatus and tolerating regular diet. Pt both breast/bottle feeding . Pt denies headache, chest pain, weakness, dizziness, N/V/D, palpitations,  worsening vaginal bleeding, or any other concerns.      Vital Signs Last 24 Hrs  T(C): 36.4 (01 Jan 2025 06:45), Max: 36.8 (31 Dec 2024 10:00)  T(F): 97.5 (01 Jan 2025 06:45), Max: 98.2 (31 Dec 2024 10:00)  HR: 65 (01 Jan 2025 06:45) (65 - 106)  BP: 110/71 (01 Jan 2025 06:45) (110/71 - 136/80)  BP(mean): 84 (01 Jan 2025 06:45) (84 - 84)  RR: 17 (01 Jan 2025 06:45) (15 - 18)  SpO2: 98% (01 Jan 2025 06:45) (94% - 99%)  Parameters below as of 01 Jan 2025 06:45  Patient On (Oxygen Delivery Method): room air        Physical Exam:     Gen: A&Ox 3, NAD  Chest: CTA B/L  Cardiac: S1,S2; RRR  Breast: Soft, nontender, nonengorged  Abdomen: Soft, nontender, ND; Fundus firm below umbilicus  Gyn: mod lochia, repaired   Ext: Nontender, no worsening edema                          10.9   16.19 )-----------( 271      ( 01 Jan 2025 06:38 )             32.7     
Patient PPD#2 s/p  at 8 weeks 4 days seen at bedside resting comfortably offers no current complaints.  Ambulating and voiding without difficulty.  Passing flatus and tolerating regular diet.  both breast/bottle feeding.    Denies HA, CP, SOB, N/V/D, dizziness, palpitations, worsening abdominal pain, worsening vaginal bleeding, or any other concerns.      Vital Signs Last 24 Hrs  T(C): 36.3 (2025 05:54), Max: 36.4 (2025 17:50)  T(F): 97.4 (2025 05:54), Max: 97.6 (2025 17:50)  HR: 66 (2025 05:54) (66 - 87)  BP: 111/73 (2025 05:54) (103/67 - 111/73)  RR: 16 (2025 05:54) (16 - 17)  SpO2: 97% (2025 05:54) (97% - 98%)    Parameters below as of 2025 05:54  Patient On (Oxygen Delivery Method): room air    Physical Exam:     Gen: A&Ox 3, NAD  Breast: Soft, nontender, nonengorged  Abdomen: Soft, nontender, ND; Fundus firm below umbilicus  Gyn: Minimal lochia  Ext: Nontender, no worsening edema                          10.9   16.19 )-----------( 271      ( 2025 06:38 )             32.7
